# Patient Record
Sex: MALE | URBAN - METROPOLITAN AREA
[De-identification: names, ages, dates, MRNs, and addresses within clinical notes are randomized per-mention and may not be internally consistent; named-entity substitution may affect disease eponyms.]

---

## 2017-01-04 ENCOUNTER — AMBULATORY - HEALTHEAST (OUTPATIENT)
Dept: NEUROSURGERY | Facility: CLINIC | Age: 57
End: 2017-01-04

## 2017-01-09 ENCOUNTER — COMMUNICATION - HEALTHEAST (OUTPATIENT)
Dept: INTERVENTIONAL RADIOLOGY/VASCULAR | Facility: CLINIC | Age: 57
End: 2017-01-09

## 2017-01-10 ENCOUNTER — AMBULATORY - HEALTHEAST (OUTPATIENT)
Dept: NEUROSURGERY | Facility: CLINIC | Age: 57
End: 2017-01-10

## 2017-01-11 ENCOUNTER — AMBULATORY - HEALTHEAST (OUTPATIENT)
Dept: NEUROSURGERY | Facility: CLINIC | Age: 57
End: 2017-01-11

## 2017-01-20 ENCOUNTER — HOSPITAL ENCOUNTER (OUTPATIENT)
Dept: MRI IMAGING | Facility: HOSPITAL | Age: 57
Discharge: HOME | End: 2017-01-20
Attending: NEUROLOGICAL SURGERY

## 2017-01-20 DIAGNOSIS — M54.9 BACK PAIN: ICD-10-CM

## 2017-01-23 ENCOUNTER — COMMUNICATION - HEALTHEAST (OUTPATIENT)
Dept: INFECTIOUS DISEASES | Facility: CLINIC | Age: 57
End: 2017-01-23

## 2017-01-24 ENCOUNTER — OFFICE VISIT - HEALTHEAST (OUTPATIENT)
Dept: NEUROSURGERY | Facility: CLINIC | Age: 57
End: 2017-01-24

## 2017-01-24 DIAGNOSIS — M60.08 ABSCESS OF PARASPINOUS MUSCLES: ICD-10-CM

## 2017-01-24 RX ORDER — DOXYCYCLINE HYCLATE 100 MG
TABLET ORAL
Status: SHIPPED | COMMUNITY
Start: 2017-01-23

## 2017-01-24 RX ORDER — BUPRENORPHINE HYDROCHLORIDE, NALOXONE HYDROCHLORIDE 8; 2 MG/1; MG/1
FILM, SOLUBLE BUCCAL; SUBLINGUAL
Status: SHIPPED | COMMUNITY
Start: 2016-10-25

## 2017-01-24 RX ORDER — BACLOFEN 10 MG/1
TABLET ORAL
Status: SHIPPED | COMMUNITY
Start: 2017-01-11

## 2017-01-24 ASSESSMENT — MIFFLIN-ST. JEOR: SCORE: 1753.4

## 2017-01-27 ENCOUNTER — COMMUNICATION - HEALTHEAST (OUTPATIENT)
Dept: INFECTIOUS DISEASES | Facility: CLINIC | Age: 57
End: 2017-01-27

## 2017-01-31 ENCOUNTER — AMBULATORY - HEALTHEAST (OUTPATIENT)
Dept: NEUROSURGERY | Facility: CLINIC | Age: 57
End: 2017-01-31

## 2017-02-09 ENCOUNTER — COMMUNICATION - HEALTHEAST (OUTPATIENT)
Dept: NEUROSURGERY | Facility: CLINIC | Age: 57
End: 2017-02-09

## 2017-02-24 ENCOUNTER — HOSPITAL ENCOUNTER (OUTPATIENT)
Dept: ULTRASOUND IMAGING | Facility: HOSPITAL | Age: 57
Discharge: HOME | End: 2017-02-24
Attending: NEUROLOGICAL SURGERY

## 2017-02-24 DIAGNOSIS — M60.08 ABSCESS OF PARASPINOUS MUSCLES: ICD-10-CM

## 2017-03-02 ENCOUNTER — OFFICE VISIT - HEALTHEAST (OUTPATIENT)
Dept: NEUROSURGERY | Facility: CLINIC | Age: 57
End: 2017-03-02

## 2017-03-02 ENCOUNTER — AMBULATORY - HEALTHEAST (OUTPATIENT)
Dept: NEUROSURGERY | Facility: CLINIC | Age: 57
End: 2017-03-02

## 2017-03-02 DIAGNOSIS — M46.26 INFECTION OF LUMBAR SPINE (H): ICD-10-CM

## 2017-03-02 DIAGNOSIS — M54.9 BACK PAIN: ICD-10-CM

## 2017-03-02 ASSESSMENT — MIFFLIN-ST. JEOR: SCORE: 1821.44

## 2017-03-22 ENCOUNTER — COMMUNICATION - HEALTHEAST (OUTPATIENT)
Dept: NEUROSURGERY | Facility: CLINIC | Age: 57
End: 2017-03-22

## 2017-03-23 ENCOUNTER — COMMUNICATION - HEALTHEAST (OUTPATIENT)
Dept: NEUROSURGERY | Facility: CLINIC | Age: 57
End: 2017-03-23

## 2017-03-27 ENCOUNTER — COMMUNICATION - HEALTHEAST (OUTPATIENT)
Dept: NEUROSURGERY | Facility: CLINIC | Age: 57
End: 2017-03-27

## 2017-03-28 ENCOUNTER — AMBULATORY - HEALTHEAST (OUTPATIENT)
Dept: NEUROSURGERY | Facility: CLINIC | Age: 57
End: 2017-03-28

## 2017-03-28 DIAGNOSIS — M54.9 BACK PAIN: ICD-10-CM

## 2017-04-10 LAB — BACTERIA SPEC CULT: NORMAL

## 2017-05-22 ENCOUNTER — COMMUNICATION - HEALTHEAST (OUTPATIENT)
Dept: NEUROSURGERY | Facility: CLINIC | Age: 57
End: 2017-05-22

## 2021-05-30 VITALS — BODY MASS INDEX: 33.18 KG/M2 | WEIGHT: 224 LBS | HEIGHT: 69 IN

## 2021-05-30 VITALS — HEIGHT: 69 IN | BODY MASS INDEX: 30.96 KG/M2 | WEIGHT: 209 LBS

## 2021-06-08 NOTE — PROGRESS NOTES
Dear Dr. Padgett:  I had the pleasure of seeing Nahum Beal in follow-up in my neurosurgery clinic on 1/24/2017.  As you well aware Nahum is a 56-year-old that is status post transforaminal lumbar interbody fusion at L4-5 by myself in May 2016.  He had some discitis osteomyelitis.  He had biopsy by IR of L5-S1 disc space through coag-negative staph.  He was treated by IV vancomycin by Dr. Garcia from infectious disease.  He was recently seen by Dr. Garcia on 1/10/2017 and it is improved CRP the thought was to switch she is 6 months of oral doxycycline.  Nahum states he still has back spasms.  He denies any bowel or bladder dysfunction.  He says some numbness down his left lower extremity which is unchanged.  Able to get around with a cane which is improved from prior.  On physical exam patient is in no acute distress   Straight leg raise test is negative bilaterally  Patient is able to ambulate without difficulty with a cane  Strength is full throughout the lower extremities  Patient does have an incision is healed very nicely in his mid back from his surgery  Above and approximately 6 cm to the right of midline he has a fluctuant edema and tenderness to palpation at a region that correlates with abscess on his MRI    MRI dated 1/22/2017 patient does have a rim-enhancing soft tissue fluid collection noted on the right suggestive of abscess formation and there was a superior one to this but this is increased in size.  Her about the L2 vertebral body level there to the right and very superficial.  For full details of the MRI findings please refer to the radiologist's transcription.    Assessment and plan:  Nahum Beal is a pleasant 56-year-old status post transforaminal lumbar interbody fusion at L4-5 by myself.  Recovery has been obligated by inflammation of the muscles as well as osteomyelitis and discitis at L4-5 and 51.  He has a new superficial abscess is detailed above in the paraspinous muscles away  from her incision site.  I did discuss his findings with Dr. Garcia by phone.  He is currently in doxycycline and he is to continue that.  I will ask IR to biopsy and aspirate this superficial abscess and sent it for cultures.  I will also check a CRP CBC and blood cultures as well.  Thank you for giving me the opportunity to see this very pleasant patient.  If you have any questions about him or any othe patients please feel free to contact me.  Of note I spent greater than 30 minutes counseling the patient regarding his pathology and treatment plan.    Sophy Mendoza

## 2021-06-08 NOTE — PROGRESS NOTES
Patient is here to follow up for post hospital.    He states that lower back spasms and pain.  Radicular Pain  Right side leg pain to ankle     Motor complaints: left leg weakness  Sensory complaints: left leg numbness down to left toes        Gait and balance issues:  yes  Bowel or bladder issues: denies  Patient has tried the following conservative measures: PT- never got a phone call to schedule      KELLY today is   78  %  Bay, CMA

## 2021-06-09 NOTE — PROGRESS NOTES
Patient is here to follow up on lab tests. He states drain site is open and contentiously draining a yellowish discharge.  He states that lower back spasms and pain.  Radicular Pain Right side leg pain to ankle   Motor complaints: left leg weakness  Sensory complaints: left leg numbness down to left toes   Gait and balance issues: yes  Bowel or bladder issues: denies  KELLY today is 74%  Bay, CMA

## 2021-06-09 NOTE — PROGRESS NOTES
I had the pleasure of seeing Nahum Beal in follow-up in my neurosurgery clinic.  Nahum has transitioned to doxycycline 100 mg daily for 6 months.  His pain is improved.  His cultures from his tap did not grow any bacteria.  His CRP was 4.4 and his white blood cell count was unremarkable.  I reviewed these lab findings with Nahum.  His exam is unchanged.  He denies any bowel bladder incontinence.  His strength is 4+ out of 5 throughout the lower extremities due to giveaway weakness he does walk with a walker.  Assessment and plan:  We'll see Nahum back in clinic in about 3-4 months with a CRP at that time.  At 6 months of oral antibiotic therapy, I would recommend a repeat MRI with and without contrast prior to stopping his antibiotics.  Should he develop any new neurologic symptoms, gallbladder dysfunction, or new radicular or back pain or weakness he is accompanied sooner for repeat MRI.  Thank you for giving me the opportunity to see this very pleasant patient.  If you have any questions about him or any othe patients please feel free to contact me.  Of note I spent greater than 25 minutes counseling the patient regarding his pathology and treatment plan.    Sophy Mendoza

## 2021-07-03 NOTE — ADDENDUM NOTE
Addendum Note by Curt Gonzalez RN at 2/9/2017  3:45 PM     Author: Curt Gonzalez RN Service: -- Author Type: Registered Nurse    Filed: 2/9/2017  3:45 PM Encounter Date: 1/24/2017 Status: Signed    : Curt Gonzalez RN (Registered Nurse)    Addended by: CURT GONZALEZ on: 2/9/2017 03:45 PM        Modules accepted: Orders